# Patient Record
Sex: MALE | Race: OTHER | HISPANIC OR LATINO | Employment: FULL TIME | URBAN - METROPOLITAN AREA
[De-identification: names, ages, dates, MRNs, and addresses within clinical notes are randomized per-mention and may not be internally consistent; named-entity substitution may affect disease eponyms.]

---

## 2023-01-23 ENCOUNTER — HOSPITAL ENCOUNTER (EMERGENCY)
Facility: HOSPITAL | Age: 48
Discharge: HOME | End: 2023-01-23
Attending: EMERGENCY MEDICINE | Admitting: EMERGENCY MEDICINE

## 2023-01-23 ENCOUNTER — APPOINTMENT (OUTPATIENT)
Dept: CT IMAGING | Facility: HOSPITAL | Age: 48
End: 2023-01-23

## 2023-01-23 VITALS
TEMPERATURE: 97.5 F | DIASTOLIC BLOOD PRESSURE: 95 MMHG | BODY MASS INDEX: 37.96 KG/M2 | HEIGHT: 67 IN | WEIGHT: 241.84 LBS | RESPIRATION RATE: 18 BRPM | SYSTOLIC BLOOD PRESSURE: 149 MMHG | HEART RATE: 50 BPM | OXYGEN SATURATION: 97 %

## 2023-01-23 DIAGNOSIS — K80.20 CALCULUS OF GALLBLADDER WITHOUT CHOLECYSTITIS WITHOUT OBSTRUCTION: ICD-10-CM

## 2023-01-23 DIAGNOSIS — M54.16 ACUTE RIGHT LUMBAR RADICULOPATHY: Primary | ICD-10-CM

## 2023-01-23 DIAGNOSIS — M54.41 ACUTE MIDLINE LOW BACK PAIN WITH RIGHT-SIDED SCIATICA: ICD-10-CM

## 2023-01-23 LAB
ALBUMIN SERPL-MCNC: 4.4 G/DL (ref 3.5–5.2)
ALBUMIN/GLOB SERPL: 1.5 G/DL
ALP SERPL-CCNC: 111 U/L (ref 39–117)
ALT SERPL W P-5'-P-CCNC: 18 U/L (ref 1–41)
ANION GAP SERPL CALCULATED.3IONS-SCNC: 8.7 MMOL/L (ref 5–15)
AST SERPL-CCNC: 20 U/L (ref 1–40)
BASOPHILS # BLD AUTO: 0.04 10*3/MM3 (ref 0–0.2)
BASOPHILS NFR BLD AUTO: 0.7 % (ref 0–1.5)
BILIRUB SERPL-MCNC: 0.3 MG/DL (ref 0–1.2)
BILIRUB UR QL STRIP: NEGATIVE
BUN SERPL-MCNC: 12 MG/DL (ref 6–20)
BUN/CREAT SERPL: 14.1 (ref 7–25)
CALCIUM SPEC-SCNC: 8.7 MG/DL (ref 8.6–10.5)
CHLORIDE SERPL-SCNC: 111 MMOL/L (ref 98–107)
CLARITY UR: CLEAR
CO2 SERPL-SCNC: 22.3 MMOL/L (ref 22–29)
COLOR UR: YELLOW
CREAT SERPL-MCNC: 0.85 MG/DL (ref 0.76–1.27)
DEPRECATED RDW RBC AUTO: 42.6 FL (ref 37–54)
EGFRCR SERPLBLD CKD-EPI 2021: 107.2 ML/MIN/1.73
EOSINOPHIL # BLD AUTO: 0.24 10*3/MM3 (ref 0–0.4)
EOSINOPHIL NFR BLD AUTO: 4 % (ref 0.3–6.2)
ERYTHROCYTE [DISTWIDTH] IN BLOOD BY AUTOMATED COUNT: 13.1 % (ref 12.3–15.4)
GLOBULIN UR ELPH-MCNC: 3 GM/DL
GLUCOSE SERPL-MCNC: 119 MG/DL (ref 65–99)
GLUCOSE UR STRIP-MCNC: NEGATIVE MG/DL
HCT VFR BLD AUTO: 44.4 % (ref 37.5–51)
HGB BLD-MCNC: 15.2 G/DL (ref 13–17.7)
HGB UR QL STRIP.AUTO: NEGATIVE
HOLD SPECIMEN: NORMAL
HOLD SPECIMEN: NORMAL
IMM GRANULOCYTES # BLD AUTO: 0.01 10*3/MM3 (ref 0–0.05)
IMM GRANULOCYTES NFR BLD AUTO: 0.2 % (ref 0–0.5)
KETONES UR QL STRIP: NEGATIVE
LEUKOCYTE ESTERASE UR QL STRIP.AUTO: NEGATIVE
LYMPHOCYTES # BLD AUTO: 1.27 10*3/MM3 (ref 0.7–3.1)
LYMPHOCYTES NFR BLD AUTO: 20.9 % (ref 19.6–45.3)
MCH RBC QN AUTO: 30.9 PG (ref 26.6–33)
MCHC RBC AUTO-ENTMCNC: 34.2 G/DL (ref 31.5–35.7)
MCV RBC AUTO: 90.2 FL (ref 79–97)
MONOCYTES # BLD AUTO: 0.47 10*3/MM3 (ref 0.1–0.9)
MONOCYTES NFR BLD AUTO: 7.7 % (ref 5–12)
NEUTROPHILS NFR BLD AUTO: 4.04 10*3/MM3 (ref 1.7–7)
NEUTROPHILS NFR BLD AUTO: 66.5 % (ref 42.7–76)
NITRITE UR QL STRIP: NEGATIVE
NRBC BLD AUTO-RTO: 0 /100 WBC (ref 0–0.2)
PH UR STRIP.AUTO: 5.5 [PH] (ref 5–8)
PLATELET # BLD AUTO: 154 10*3/MM3 (ref 140–450)
PMV BLD AUTO: 10.9 FL (ref 6–12)
POTASSIUM SERPL-SCNC: 4.3 MMOL/L (ref 3.5–5.2)
PROT SERPL-MCNC: 7.4 G/DL (ref 6–8.5)
PROT UR QL STRIP: ABNORMAL
RBC # BLD AUTO: 4.92 10*6/MM3 (ref 4.14–5.8)
SODIUM SERPL-SCNC: 142 MMOL/L (ref 136–145)
SP GR UR STRIP: >1.03 (ref 1–1.03)
UROBILINOGEN UR QL STRIP: ABNORMAL
WBC NRBC COR # BLD: 6.07 10*3/MM3 (ref 3.4–10.8)
WHOLE BLOOD HOLD COAG: NORMAL
WHOLE BLOOD HOLD SPECIMEN: NORMAL

## 2023-01-23 PROCEDURE — 85025 COMPLETE CBC W/AUTO DIFF WBC: CPT

## 2023-01-23 PROCEDURE — 25010000002 ONDANSETRON PER 1 MG: Performed by: EMERGENCY MEDICINE

## 2023-01-23 PROCEDURE — 74176 CT ABD & PELVIS W/O CONTRAST: CPT

## 2023-01-23 PROCEDURE — 80053 COMPREHEN METABOLIC PANEL: CPT | Performed by: EMERGENCY MEDICINE

## 2023-01-23 PROCEDURE — 96375 TX/PRO/DX INJ NEW DRUG ADDON: CPT

## 2023-01-23 PROCEDURE — 63710000001 PREDNISONE PER 5 MG: Performed by: EMERGENCY MEDICINE

## 2023-01-23 PROCEDURE — 25010000002 HYDROMORPHONE 1 MG/ML SOLUTION: Performed by: EMERGENCY MEDICINE

## 2023-01-23 PROCEDURE — 99283 EMERGENCY DEPT VISIT LOW MDM: CPT

## 2023-01-23 PROCEDURE — 36415 COLL VENOUS BLD VENIPUNCTURE: CPT

## 2023-01-23 PROCEDURE — 25010000002 KETOROLAC TROMETHAMINE PER 15 MG: Performed by: EMERGENCY MEDICINE

## 2023-01-23 PROCEDURE — 96374 THER/PROPH/DIAG INJ IV PUSH: CPT

## 2023-01-23 PROCEDURE — 81003 URINALYSIS AUTO W/O SCOPE: CPT | Performed by: EMERGENCY MEDICINE

## 2023-01-23 RX ORDER — ONDANSETRON 2 MG/ML
4 INJECTION INTRAMUSCULAR; INTRAVENOUS ONCE
Status: COMPLETED | OUTPATIENT
Start: 2023-01-23 | End: 2023-01-23

## 2023-01-23 RX ORDER — KETOROLAC TROMETHAMINE 30 MG/ML
30 INJECTION, SOLUTION INTRAMUSCULAR; INTRAVENOUS ONCE
Status: COMPLETED | OUTPATIENT
Start: 2023-01-23 | End: 2023-01-23

## 2023-01-23 RX ORDER — IBUPROFEN 800 MG/1
800 TABLET ORAL EVERY 8 HOURS PRN
Qty: 20 TABLET | Refills: 0 | Status: SHIPPED | OUTPATIENT
Start: 2023-01-23

## 2023-01-23 RX ORDER — SODIUM CHLORIDE 0.9 % (FLUSH) 0.9 %
10 SYRINGE (ML) INJECTION AS NEEDED
Status: DISCONTINUED | OUTPATIENT
Start: 2023-01-23 | End: 2023-01-23 | Stop reason: HOSPADM

## 2023-01-23 RX ORDER — HYDROCODONE BITARTRATE AND ACETAMINOPHEN 7.5; 325 MG/1; MG/1
1 TABLET ORAL EVERY 6 HOURS PRN
Qty: 12 TABLET | Refills: 0 | Status: SHIPPED | OUTPATIENT
Start: 2023-01-23

## 2023-01-23 RX ORDER — LISINOPRIL 20 MG/1
20 TABLET ORAL DAILY
COMMUNITY

## 2023-01-23 RX ORDER — PREDNISONE 20 MG/1
40 TABLET ORAL DAILY
Qty: 10 TABLET | Refills: 0 | Status: SHIPPED | OUTPATIENT
Start: 2023-01-23 | End: 2023-01-28

## 2023-01-23 RX ADMIN — KETOROLAC TROMETHAMINE 30 MG: 30 INJECTION, SOLUTION INTRAMUSCULAR; INTRAVENOUS at 07:47

## 2023-01-23 RX ADMIN — ONDANSETRON 4 MG: 2 INJECTION INTRAMUSCULAR; INTRAVENOUS at 07:47

## 2023-01-23 RX ADMIN — PREDNISONE 60 MG: 50 TABLET ORAL at 07:50

## 2023-01-23 RX ADMIN — HYDROMORPHONE HYDROCHLORIDE 1 MG: 1 INJECTION, SOLUTION INTRAMUSCULAR; INTRAVENOUS; SUBCUTANEOUS at 07:47

## 2023-01-23 NOTE — DISCHARGE INSTRUCTIONS
Your appendix looked normal and no kidney stones were seen on your CT scan.    You did have an incidental finding of some gallstones in the gallbladder that have probably been there for a while but no signs of gallbladder infection or inflammation today.    Your blood work looks normal.    The CT scan also notes some degenerative changes throughout your lumbar spine, and your pain today is most likely due to a pinched nerve in the lower back possibly due to a bulging or herniated disc in the lumbar spine.    For now, get some rest, take ibuprofen 3 times a day with meals for pain and inflammation and you can also try taking prednisone as an anti-inflammatory and only use the hydrocodone pain medicine for severe pain if needed.

## 2023-01-23 NOTE — ED PROVIDER NOTES
"Time: 6:35 AM EST  Date of encounter:  1/23/2023  Independent Historian/Clinical History and Information was obtained by:   Patient  Chief Complaint: Back Pain    History is limited by: N/A    History of Present Illness:      Patient is a 47 y.o. year old male who presents to the emergency department for evaluation of back pain. Pt reports a history of back pain. Relative states that pt's back pain is centralized and that she found pt on his knees around 0400 today. Pt reports taking pain reliever at home prior to arrival to the ED. Relative also reports \"cloudy urine\". Pt states that his pain is \"everywhere\", but reports radiation to the right abdominal and right upper thigh area. Pt reports moving drywall yesterday when pain started and worsened last night. Pt also reports that he has not been to a doctor in 7 years.  Pt denies hematuria and dysuria. Pt states that pain is reproducible when moving. Pt denies fever and vomiting. Pt denies being allergic to any medication.      History provided by:  Relative   used: No        Patient Care Team  Primary Care Provider: Provider, No Known    Past Medical History:   Reviewed, hypertension  No Known Allergies  Past Medical History:   Diagnosis Date   • Hypertension      History reviewed. No pertinent surgical history.  History reviewed. No pertinent family history.    Home Medications:  Prior to Admission medications    Not on File        Social History:   Social History     Tobacco Use   • Smoking status: Every Day     Packs/day: 1.00     Types: Cigarettes   Substance Use Topics   • Alcohol use: Yes     Alcohol/week: 42.0 standard drinks     Types: 42 Cans of beer per week   • Drug use: Not Currently         Review of Systems:  Review of Systems   Constitutional: Negative for chills and fever.   HENT: Negative for congestion, ear pain and sore throat.    Eyes: Negative for pain.   Respiratory: Negative for cough, chest tightness and shortness of " "breath.    Cardiovascular: Negative for chest pain.   Gastrointestinal: Negative for abdominal pain, diarrhea, nausea and vomiting.   Genitourinary: Negative for flank pain and hematuria.   Musculoskeletal: Positive for back pain. Negative for joint swelling.   Skin: Negative for pallor.   Neurological: Negative for seizures and headaches.   All other systems reviewed and are negative.       Physical Exam:  /95   Pulse 50   Temp 97.5 °F (36.4 °C) (Oral)   Resp 18   Ht 170.2 cm (67\")   Wt 110 kg (241 lb 13.5 oz)   SpO2 97%   BMI 37.88 kg/m²     Physical Exam  Vitals and nursing note reviewed.   Constitutional:       General: He is not in acute distress.     Appearance: Normal appearance. He is not toxic-appearing.   HENT:      Head: Normocephalic and atraumatic.      Mouth/Throat:      Mouth: Mucous membranes are moist.   Eyes:      General: No scleral icterus.  Cardiovascular:      Rate and Rhythm: Normal rate and regular rhythm.      Pulses: Normal pulses.      Heart sounds: Normal heart sounds.   Pulmonary:      Effort: Pulmonary effort is normal. No respiratory distress.      Breath sounds: Normal breath sounds.   Abdominal:      General: Abdomen is flat.      Palpations: Abdomen is soft.      Tenderness: There is abdominal tenderness (mild) in the right upper quadrant and right lower quadrant. There is no right CVA tenderness, left CVA tenderness, guarding or rebound.   Musculoskeletal:         General: Normal range of motion.      Right wrist: Normal pulse.      Left wrist: Normal pulse.      Cervical back: Normal range of motion and neck supple.      Lumbar back: Tenderness present.   Skin:     General: Skin is warm and dry.   Neurological:      Mental Status: He is alert and oriented to person, place, and time. Mental status is at baseline.      Motor: Motor function is intact.   Psychiatric:      Comments: Uncomfortable                  Procedures:  Procedures      Medical Decision " Making:      Comorbidities that affect care:    Chronic low back pain    External Notes reviewed:    None      The following orders were placed and all results were independently analyzed by me:  Orders Placed This Encounter   Procedures   • CT Abdomen Pelvis Without Contrast   • [place] Draw   • Urinalysis With Microscopic If Indicated (No Culture) - Urine, Clean Catch   • Comprehensive Metabolic Panel   • CBC Auto Differential   • CBC & Differential   • Green Top (Gel)   • Lavender Top   • Gold Top - SST   • Light Blue Top       Medications Given in the Emergency Department:  Medications   ketorolac (TORADOL) injection 30 mg (30 mg Intravenous Given 1/23/23 0747)   HYDROmorphone (DILAUDID) injection 1 mg (1 mg Intravenous Given 1/23/23 0747)   ondansetron (ZOFRAN) injection 4 mg (4 mg Intravenous Given 1/23/23 0747)   predniSONE (DELTASONE) tablet 60 mg (60 mg Oral Given 1/23/23 0750)        ED Course:    ED Course as of 01/23/23 1552   Mon Jan 23, 2023   0802 Albumin: 4.4 [VS]      ED Course User Index  [VS] Evan Duke MD       Labs:    Lab Results (last 24 hours)     Procedure Component Value Units Date/Time    CBC & Differential [[id number]]  (Normal) Collected: 01/23/23 0617    Specimen: Blood Updated: 01/23/23 0633    Narrative:      The following orders were created for panel order CBC & Differential.  Procedure                               Abnormality         Status                     ---------                               -----------         ------                     CBC Auto Differential[[id number]]        Normal              Final result                 Please view results for these tests on the individual orders.    Comprehensive Metabolic Panel [[id number]]  (Abnormal) Collected: 01/23/23 0617    Specimen: Blood Updated: 01/23/23 0717     Glucose 119 mg/dL      BUN 12 mg/dL      Creatinine 0.85 mg/dL      Sodium 142 mmol/L      Potassium 4.3 mmol/L      Chloride 111 mmol/L      CO2 22.3 mmol/L       Calcium 8.7 mg/dL      Total Protein 7.4 g/dL      Albumin 4.4 g/dL      ALT (SGPT) 18 U/L      AST (SGOT) 20 U/L      Alkaline Phosphatase 111 U/L      Total Bilirubin 0.3 mg/dL      Globulin 3.0 gm/dL      A/G Ratio 1.5 g/dL      BUN/Creatinine Ratio 14.1     Anion Gap 8.7 mmol/L      eGFR 107.2 mL/min/1.73     Narrative:      GFR Normal >60  Chronic Kidney Disease <60  Kidney Failure <15      CBC Auto Differential [[id number]]  (Normal) Collected: 01/23/23 0617    Specimen: Blood Updated: 01/23/23 0633     WBC 6.07 10*3/mm3      RBC 4.92 10*6/mm3      Hemoglobin 15.2 g/dL      Hematocrit 44.4 %      MCV 90.2 fL      MCH 30.9 pg      MCHC 34.2 g/dL      RDW 13.1 %      RDW-SD 42.6 fl      MPV 10.9 fL      Platelets 154 10*3/mm3      Neutrophil % 66.5 %      Lymphocyte % 20.9 %      Monocyte % 7.7 %      Eosinophil % 4.0 %      Basophil % 0.7 %      Immature Grans % 0.2 %      Neutrophils, Absolute 4.04 10*3/mm3      Lymphocytes, Absolute 1.27 10*3/mm3      Monocytes, Absolute 0.47 10*3/mm3      Eosinophils, Absolute 0.24 10*3/mm3      Basophils, Absolute 0.04 10*3/mm3      Immature Grans, Absolute 0.01 10*3/mm3      nRBC 0.0 /100 WBC     Urinalysis With Microscopic If Indicated (No Culture) - Urine, Clean Catch [[id number]]  (Abnormal) Collected: 01/23/23 0645    Specimen: Urine, Clean Catch Updated: 01/23/23 0700     Color, UA Yellow     Appearance, UA Clear     pH, UA 5.5     Specific Gravity, UA >1.030     Glucose, UA Negative     Ketones, UA Negative     Bilirubin, UA Negative     Blood, UA Negative     Protein, UA Trace     Leuk Esterase, UA Negative     Nitrite, UA Negative     Urobilinogen, UA 1.0 E.U./dL    Narrative:      Urine microscopic not indicated.           Imaging:    CT Abdomen Pelvis Without Contrast    Result Date: 1/23/2023  PROCEDURE: CT ABDOMEN PELVIS WO CONTRAST  COMPARISON: None  INDICATIONS: GENERALIZED RIGHT SIDED ABDOMEN PAIN  TECHNIQUE: CT images were created without intravenous  contrast.   PROTOCOL:   Standard imaging protocol performed    RADIATION:   DLP: 911.4mGy*cm   Automated exposure control was utilized to minimize radiation dose.  FINDINGS:    Lung bases:  Limited imaging of the lung bases is grossly clear.  No free air noted below the diaphragm.    Organs:  Partially calcified stone noted within the gallbladder.  Gallbladder is otherwise grossly unremarkable on limited noncontrast imaging.  Perigraft limited noncontrast imaging of the liver, spleen, pancreas, kidneys and adrenal glands appear unremarkable.  GI tract:  Limited noncontrast imaging of the stomach and small bowel are unremarkable in appearance.  The ileocecal valve is unremarkable.  The appendix appears within normal limits.  The colon demonstrates no acute abnormality.  No suspicious mesenteric adenopathy or fluid collections noted.  Some small partially calcified lymph nodes noted within the right lower quadrant.  Pelvis:  Some calcification is noted within the prostate which is otherwise grossly unremarkable in appearance.  The bladder is incompletely distended.  Retroperitoneum:  The aorta is normal in caliber.  No suspicious retroperitoneal adenopathy is noted.  Bones and soft tissues:  Multilevel degenerative changes noted of the spine.  No destructive bone lesion.          1. Cholelithiasis with no definite of evidence of acute cholecystitis on CT imaging.  Ultrasound could always be considered if clinically indicated 2. Appendix is visualized and appears normal 3. No definite acute intra-abdominal or intrapelvic abnormality on limited noncontrast imaging     CEE BASS MD       Electronically Signed and Approved By: CEE BASS MD on 1/23/2023 at 7:54                 Differential Diagnosis and Discussion:    Back Pain: The patient presents with back pain. My differential diagnosis includes but is not limited to acute spinal epidural abscess, acute spinal epidural bleed, cauda equina syndrome,  abdominal aortic aneurysm, aortic dissection, kidney stone, pyelonephritis, musculoskeletal back pain, spinal fracture, and osteoarthritis.     All labs were reviewed and analyzed by me.  CT scan radiology interpretation was reviewed by me.    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    He looks to being significant pain with trying to walk so I did give him a dose of IV pain and nausea medicine for symptom relief.    I check some lab work here which was mostly unremarkable and also ordered a CT of the abdomen pelvis because pain radiate from the right side of his low back to the right lower quadrant of the abdomen I want a make sure he did not have a obstructing kidney stone.    CT came back negative for any acute findings but it does show incidental finding of cholelithiasis without cholecystitis and also incidental finding of degenerative changes in his lumbar spine.    I given supportive care and follow-up for his gallstones with the general surgery clinic and low-fat diet recommended.    I will call in some pain meds and anti-inflammatories and a work note for what appears to be some type of radiculopathy from his back pain and I will have him follow-up with his doctor to discuss possible need for further evaluation with MRI lumbar spine.  No bowel or bladder incontinence or neurologic deficits are noted today and he can be safely discharged.          Patient Care Considerations:          Consultants/Shared Management Plan:        Social Determinants of Health:    Patient is independent, reliable, and has access to care.       Disposition and Care Coordination:    Discharged: I considered escalation of care by admitting this patient for observation, however the patient  has improved and is suitable and  stable for discharge.    I have explained the patient´s condition, diagnoses and treatment plan based on the information available to me at this time. I have answered questions and addressed any concerns. The patient has a good  understanding of the patient´s diagnosis, condition, and treatment plan as can be expected at this point. The vital signs have been stable. The patient´s condition is stable and appropriate for discharge from the emergency department.      The patient will pursue further outpatient evaluation with the primary care physician or other designated or consulting physician as outlined in the discharge instructions. They are agreeable to this plan of care and follow-up instructions have been explained in detail. The patient has received these instructions in written format and have expressed an understanding of the discharge instructions. The patient is aware that any significant change in condition or worsening of symptoms should prompt an immediate return to this or the closest emergency department or call to 911.  I have explained discharge medications and the need for follow up with the patient/caretakers. This was also printed in the discharge instructions. Patient was discharged with the following medications and follow up:      Medication List      New Prescriptions    HYDROcodone-acetaminophen 7.5-325 MG per tablet  Commonly known as: NORCO  Take 1 tablet by mouth Every 6 (Six) Hours As Needed for Severe Pain.     ibuprofen 800 MG tablet  Commonly known as: ADVIL,MOTRIN  Take 1 tablet by mouth Every 8 (Eight) Hours As Needed for Moderate Pain.     predniSONE 20 MG tablet  Commonly known as: DELTASONE  Take 2 tablets by mouth Daily for 5 days.           Where to Get Your Medications      These medications were sent to [facility], [place] - [address] [facility] [phone] [facility] [phone] [address] [address]     Phone: [phone]   · HYDROcodone-acetaminophen 7.5-325 MG per tablet  · ibuprofen 800 MG tablet  · predniSONE 20 MG tablet      Provider, No Known  [facility]  [place] [address]    Call in 3 days  As needed, If symptoms worsen    Gianfranco Nash MD  [address]  [place] [address]  [phone]    Call in 1 week  As needed, If symptoms worsen for your gallstones to talk about having your gallbladder removed.       Final diagnoses:   Acute right lumbar radiculopathy   Acute midline low back pain with right-sided sciatica   Calculus of gallbladder without cholecystitis without obstruction        ED Disposition     ED Disposition   Discharge    Condition   Stable    Comment   --             This medical record created using voice recognition software.        Documentation assistance provided by Norberto Loza, acting as scribe for Evan Duke MD. Information recorded by the scribe was done at my direction and has been verified and validated by me.       Norberto Loza  01/23/23 7206       Evan Duke MD  01/23/23 4782

## 2023-01-23 NOTE — Clinical Note
[facility] EMERGENCY ROOM  [address]  [place] [address]  Phone: [phone]    Subhash Mackenzie was seen and treated in our emergency department on 1/23/2023.  He may return to work on 01/26/2023.         Thank you for choosing [facility].    Evan Duke MD

## 2023-01-23 NOTE — ED TRIAGE NOTES
Patient having back pain since Thursday.  Wife states he is a  and lifts things at work a lot.  States he has hurt his back like this before but not to this extent.  Pain is in his lower back and radiates around to his lower abdomen.     Denies N/V or painful urination.